# Patient Record
Sex: MALE | Race: BLACK OR AFRICAN AMERICAN | NOT HISPANIC OR LATINO | URBAN - NONMETROPOLITAN AREA
[De-identification: names, ages, dates, MRNs, and addresses within clinical notes are randomized per-mention and may not be internally consistent; named-entity substitution may affect disease eponyms.]

---

## 2020-09-03 ENCOUNTER — IMPORTED ENCOUNTER (OUTPATIENT)
Dept: URBAN - NONMETROPOLITAN AREA CLINIC 1 | Facility: CLINIC | Age: 65
End: 2020-09-03

## 2020-09-03 PROBLEM — H25.813: Noted: 2020-09-03

## 2020-09-03 PROCEDURE — 92004 COMPRE OPH EXAM NEW PT 1/>: CPT

## 2020-09-03 NOTE — PATIENT DISCUSSION
Cataract OU-Visually significant cataract OU.-Cataract(s) causing symptomatic impairment of visual function not correctable with a tolerable change in glasses or contact lenses lighting or non-operative means resulting in specific activity limitations and/or participation restrictions including but not limited to reading viewing television driving or meeting vocational or recreational needs. -Expectation is clearer vision and functional improvement in symptoms as well as reduced glare disability after cataract removal.-Order IOLMaster and OPD today. -Recommend Standard/Traditional OU based on today's OPD testing and lifestyle questionnaire.-All questions were answered regarding surgery including pre and post-op medications appointments activity restrictions and anesthetic usage.-The risks benefits and alternatives and special risk factors for the patient were discussed in detail including but not limited to: bleeding infection retinal detachment vitreous loss problems with the implant and possible need for additional surgery.-Although rare the possibility of complete vision loss was discussed.-The possible need for glasses post-operatively was discussed.-Order medical clearance exam based on history of diabetes and high blood pressure-Patient elects to proceed with cataract surgery OS. Will schedule at patient's convenience and re-evaluate OD in the future. ***Patient elects Standard/Traditional OU starting with OS**Recommend Complex/Trypan OU Viscoat OU Extra Provisc OU*POOR DILATION

## 2020-09-16 ENCOUNTER — IMPORTED ENCOUNTER (OUTPATIENT)
Dept: URBAN - NONMETROPOLITAN AREA CLINIC 1 | Facility: CLINIC | Age: 65
End: 2020-09-16

## 2020-09-16 PROBLEM — H25.813: Noted: 2020-09-16

## 2020-09-17 ENCOUNTER — IMPORTED ENCOUNTER (OUTPATIENT)
Dept: URBAN - NONMETROPOLITAN AREA CLINIC 1 | Facility: CLINIC | Age: 65
End: 2020-09-17

## 2020-09-17 PROBLEM — E11.9: Noted: 2020-09-17

## 2020-09-17 PROBLEM — I10: Noted: 2020-09-17

## 2020-09-17 PROBLEM — Z01.818: Noted: 2020-09-17

## 2020-09-29 ENCOUNTER — IMPORTED ENCOUNTER (OUTPATIENT)
Dept: URBAN - NONMETROPOLITAN AREA CLINIC 1 | Facility: CLINIC | Age: 65
End: 2020-09-29

## 2020-09-29 PROBLEM — H25.11: Noted: 2020-09-29

## 2020-09-29 PROBLEM — Z98.42: Noted: 2020-09-29

## 2020-09-29 PROBLEM — H25.811: Noted: 2020-10-07

## 2020-09-29 PROCEDURE — 66982 XCAPSL CTRC RMVL CPLX WO ECP: CPT

## 2020-09-29 PROCEDURE — 92136 OPHTHALMIC BIOMETRY: CPT

## 2020-09-29 PROCEDURE — 99024 POSTOP FOLLOW-UP VISIT: CPT

## 2020-09-29 NOTE — PATIENT DISCUSSION
Same Day POV CE OS 9/29/20 Standard -Pt doing well s/p PCIOL. -Continue post-op gtts according to instruction sheet and sleep with eye shield over eye for 7 nights.-Avoid bending at the waist lifting anything over 5lbs and dirty or rickey environments. -RTC .

## 2020-10-07 ENCOUNTER — IMPORTED ENCOUNTER (OUTPATIENT)
Dept: URBAN - NONMETROPOLITAN AREA CLINIC 1 | Facility: CLINIC | Age: 65
End: 2020-10-07

## 2020-10-07 PROCEDURE — 99024 POSTOP FOLLOW-UP VISIT: CPT

## 2020-10-07 NOTE — PATIENT DISCUSSION
1 week s/p PCIOL OS stand/trad (09/29/20)-Pt doing well at 1 week s/p PCIOL. -Continue post-op gtts according to instruction sheet.-Okay to resume usual activites and d/c eye shield. Cataract OD- Recommend proceed with cataract surgery as previously scheduled and discussed with Dr. Michelle Jimenez.

## 2020-10-15 ENCOUNTER — IMPORTED ENCOUNTER (OUTPATIENT)
Dept: URBAN - NONMETROPOLITAN AREA CLINIC 1 | Facility: CLINIC | Age: 65
End: 2020-10-15

## 2020-10-15 PROBLEM — Z98.41: Noted: 2020-10-15

## 2020-10-15 PROBLEM — Z98.42: Noted: 2020-10-15

## 2020-10-15 PROCEDURE — 66984 XCAPSL CTRC RMVL W/O ECP: CPT

## 2020-10-15 PROCEDURE — 92136 OPHTHALMIC BIOMETRY: CPT

## 2020-10-15 PROCEDURE — 99024 POSTOP FOLLOW-UP VISIT: CPT

## 2020-10-15 NOTE — PATIENT DISCUSSION
Same Day s/p PCIOL OD (10/15/20)-Pt doing well s/p PCIOL. -Continue post-op gtts according to instruction sheet and sleep with eye shield over eye for 7 nights.-Avoid bending at the waist lifting anything over 5lbs and dirty or rickey environments. 2 week s/p PCIOL OS stand/trad (09/29/20)-Pt doing well at 2 week s/p PCIOL. -Continue post-op gtts according to instruction sheet.-Okay to resume usual activites and d/c eye shield.

## 2020-10-26 ENCOUNTER — IMPORTED ENCOUNTER (OUTPATIENT)
Dept: URBAN - NONMETROPOLITAN AREA CLINIC 1 | Facility: CLINIC | Age: 65
End: 2020-10-26

## 2020-10-26 PROCEDURE — 99024 POSTOP FOLLOW-UP VISIT: CPT

## 2020-10-26 NOTE — PATIENT DISCUSSION
1 week s/p PCIOLOD (10/15/20)-Pt doing well at 1 week s/p PCIOL. -Continue post-op gtts according to instruction sheet.-Okay to resume usual activites and d/c eye shield. 4 week s/p PCIOL OS stand/trad (09/29/20)-Pt doing well at 4 week s/p PCIOL. -Patient has completed post op drops. -Monitor for PCO OS.

## 2020-11-18 ENCOUNTER — IMPORTED ENCOUNTER (OUTPATIENT)
Dept: URBAN - NONMETROPOLITAN AREA CLINIC 1 | Facility: CLINIC | Age: 65
End: 2020-11-18

## 2020-11-18 PROCEDURE — 99024 POSTOP FOLLOW-UP VISIT: CPT

## 2020-11-18 NOTE — PATIENT DISCUSSION
1 month s/p PCIOL OD stand/trad (10/15/20)-Pt doing well at 1 month s/p PCIOL. -Patient has completed post op drops. -Monitor for PCO OS. 2 month s/p PCIOL OS stand/trad (09/29/20)-Pt doing well at 2 months s/p PCIOL. -Patient has completed post op drops. -Monitor for PCO OS.

## 2022-04-10 ASSESSMENT — VISUAL ACUITY
OS_CC: 20/50
OS_GLARE: 20/100
OD_SC: 20/50
OS_AM: 20/25
OS_AM: 20/25
OD_PAM: 20/25
OS_CC: 20/50
OD_GLARE: 20/100
OS_PH: 20/60
OS_CC: 20/20
OS_PH: 20/30-1
OS_CC: 20/30-2
OD_CC: 20/25+
OD_CC: 20/30+2
OD_PAM: 20/25
OD_CC: 20/20
OD_CC: 20/25-1
OD_SC: 20/50
OD_CC: 20/25+
OS_SC: 20/40
OD_CC: 20/25-1
OD_GLARE: 20/100
OD_PAM: 20/25
OS_CC: 20/70
OD_PH: 20/40
OS_CC: 20/30+2
OD_CC: 20/60
OD_GLARE: 20/100
OS_GLARE: 20/100
OS_PH: 20/30-1
OS_CC: 20/20

## 2022-04-10 ASSESSMENT — TONOMETRY
OS_IOP_MMHG: 16
OD_IOP_MMHG: 13
OD_IOP_MMHG: 12
OD_IOP_MMHG: 16
OD_IOP_MMHG: 15
OS_IOP_MMHG: 12
OD_IOP_MMHG: 16
OS_IOP_MMHG: 12
OS_IOP_MMHG: 20
OS_IOP_MMHG: 14
OS_IOP_MMHG: 16
OD_IOP_MMHG: 13

## 2022-09-28 ENCOUNTER — ESTABLISHED PATIENT (OUTPATIENT)
Dept: RURAL CLINIC 3 | Facility: CLINIC | Age: 67
End: 2022-09-28

## 2022-09-28 DIAGNOSIS — E11.9: ICD-10-CM

## 2022-09-28 DIAGNOSIS — H52.4: ICD-10-CM

## 2022-09-28 DIAGNOSIS — H26.493: ICD-10-CM

## 2022-09-28 PROCEDURE — 92015 DETERMINE REFRACTIVE STATE: CPT

## 2022-09-28 PROCEDURE — 99214 OFFICE O/P EST MOD 30 MIN: CPT

## 2022-09-28 PROCEDURE — 92250 FUNDUS PHOTOGRAPHY W/I&R: CPT

## 2022-09-28 ASSESSMENT — VISUAL ACUITY
OD_SC: 20/20
OS_SC: 20/25-1

## 2022-09-28 ASSESSMENT — TONOMETRY
OS_IOP_MMHG: 15
OD_IOP_MMHG: 15

## 2023-10-03 ENCOUNTER — FOLLOW UP (OUTPATIENT)
Dept: RURAL CLINIC 3 | Facility: CLINIC | Age: 68
End: 2023-10-03

## 2023-10-03 DIAGNOSIS — H26.493: ICD-10-CM

## 2023-10-03 DIAGNOSIS — E11.9: ICD-10-CM

## 2023-10-03 DIAGNOSIS — H52.4: ICD-10-CM

## 2023-10-03 PROCEDURE — 99214 OFFICE O/P EST MOD 30 MIN: CPT

## 2023-10-03 PROCEDURE — 92015 DETERMINE REFRACTIVE STATE: CPT

## 2023-10-03 PROCEDURE — 92250 FUNDUS PHOTOGRAPHY W/I&R: CPT

## 2023-10-03 ASSESSMENT — TONOMETRY
OS_IOP_MMHG: 14
OD_IOP_MMHG: 14

## 2023-10-03 ASSESSMENT — VISUAL ACUITY
OS_SC: 20/20
OD_SC: 20/20

## 2024-10-04 ENCOUNTER — FOLLOW UP (OUTPATIENT)
Dept: RURAL CLINIC 3 | Facility: CLINIC | Age: 69
End: 2024-10-04

## 2024-10-04 DIAGNOSIS — Z96.1: ICD-10-CM

## 2024-10-04 DIAGNOSIS — H26.493: ICD-10-CM

## 2024-10-04 DIAGNOSIS — E11.9: ICD-10-CM

## 2024-10-04 PROCEDURE — 92250 FUNDUS PHOTOGRAPHY W/I&R: CPT

## 2024-10-04 PROCEDURE — 99214 OFFICE O/P EST MOD 30 MIN: CPT
